# Patient Record
Sex: FEMALE | Race: WHITE
[De-identification: names, ages, dates, MRNs, and addresses within clinical notes are randomized per-mention and may not be internally consistent; named-entity substitution may affect disease eponyms.]

---

## 2017-04-21 ENCOUNTER — HOSPITAL ENCOUNTER (EMERGENCY)
Dept: HOSPITAL 17 - PHED | Age: 27
Discharge: HOME | End: 2017-04-21
Payer: SELF-PAY

## 2017-04-21 VITALS
SYSTOLIC BLOOD PRESSURE: 151 MMHG | OXYGEN SATURATION: 98 % | DIASTOLIC BLOOD PRESSURE: 79 MMHG | HEART RATE: 93 BPM | TEMPERATURE: 98.1 F | RESPIRATION RATE: 16 BRPM

## 2017-04-21 VITALS
OXYGEN SATURATION: 98 % | RESPIRATION RATE: 18 BRPM | SYSTOLIC BLOOD PRESSURE: 110 MMHG | DIASTOLIC BLOOD PRESSURE: 74 MMHG | HEART RATE: 68 BPM

## 2017-04-21 VITALS — HEIGHT: 60 IN | WEIGHT: 132.28 LBS | BODY MASS INDEX: 25.97 KG/M2

## 2017-04-21 VITALS
SYSTOLIC BLOOD PRESSURE: 106 MMHG | HEART RATE: 62 BPM | RESPIRATION RATE: 18 BRPM | OXYGEN SATURATION: 98 % | DIASTOLIC BLOOD PRESSURE: 73 MMHG

## 2017-04-21 DIAGNOSIS — F17.200: ICD-10-CM

## 2017-04-21 DIAGNOSIS — R00.2: Primary | ICD-10-CM

## 2017-04-21 DIAGNOSIS — T50.905A: ICD-10-CM

## 2017-04-21 DIAGNOSIS — Z86.59: ICD-10-CM

## 2017-04-21 LAB
ANION GAP SERPL CALC-SCNC: 7 MEQ/L (ref 5–15)
APAP SERPL-MCNC: 4.6 MCG/ML (ref 10–30)
BASOPHILS # BLD AUTO: 0 TH/MM3 (ref 0–0.2)
BASOPHILS NFR BLD: 0.7 % (ref 0–2)
BUN SERPL-MCNC: 7 MG/DL (ref 7–18)
CHLORIDE SERPL-SCNC: 105 MEQ/L (ref 98–107)
EOSINOPHIL # BLD: 0.1 TH/MM3 (ref 0–0.4)
EOSINOPHIL NFR BLD: 2.4 % (ref 0–4)
ERYTHROCYTE [DISTWIDTH] IN BLOOD BY AUTOMATED COUNT: 12.2 % (ref 11.6–17.2)
GFR SERPLBLD BASED ON 1.73 SQ M-ARVRAT: 120 ML/MIN (ref 89–?)
HCO3 BLD-SCNC: 26.8 MEQ/L (ref 21–32)
HCT VFR BLD CALC: 36.9 % (ref 35–46)
HEMO FLAGS: (no result)
LYMPHOCYTES # BLD AUTO: 1.1 TH/MM3 (ref 1–4.8)
LYMPHOCYTES NFR BLD AUTO: 27.3 % (ref 9–44)
MCH RBC QN AUTO: 30.5 PG (ref 27–34)
MCHC RBC AUTO-ENTMCNC: 35.4 % (ref 32–36)
MCV RBC AUTO: 86.3 FL (ref 80–100)
MONOCYTES NFR BLD: 13.1 % (ref 0–8)
NEUTROPHILS # BLD AUTO: 2.4 TH/MM3 (ref 1.8–7.7)
NEUTROPHILS NFR BLD AUTO: 56.5 % (ref 16–70)
PLATELET # BLD: 199 TH/MM3 (ref 150–450)
POTASSIUM SERPL-SCNC: 3.8 MEQ/L (ref 3.5–5.1)
RBC # BLD AUTO: 4.27 MIL/MM3 (ref 4–5.3)
SODIUM SERPL-SCNC: 139 MEQ/L (ref 136–145)
WBC # BLD AUTO: 4.1 TH/MM3 (ref 4–11)

## 2017-04-21 PROCEDURE — 80307 DRUG TEST PRSMV CHEM ANLYZR: CPT

## 2017-04-21 PROCEDURE — 85025 COMPLETE CBC W/AUTO DIFF WBC: CPT

## 2017-04-21 PROCEDURE — 99283 EMERGENCY DEPT VISIT LOW MDM: CPT

## 2017-04-21 PROCEDURE — 96360 HYDRATION IV INFUSION INIT: CPT

## 2017-04-21 PROCEDURE — 80048 BASIC METABOLIC PNL TOTAL CA: CPT

## 2017-04-21 NOTE — PD
HPI


Chief Complaint:  Allergic/Adverse Reaction


Time Seen by Provider:  18:48


Travel History


International Travel<30 days:  No


Contact w/Intl Traveler<30days:  No


Traveled to known affect area:  No





History of Present Illness


HPI


This 26-year-old female says she doesn't feel well.  She been taking some over-

the-counter cough and cold medicine when she went to see her doctor today who 

gave her additional cough and cold medicine.  He has taken Delsym, Sudafed, 

cetirizine and other medication.  She thought she was given a pass out earlier.

  She had some palpitations.





PFSH


Past Medical History


Anxiety:  Yes





Social History


Alcohol Use:  No


Tobacco Use:  Yes (1/2PPD)


Substance Use:  No





Allergies-Medications


(Allergen,Severity, Reaction):  


Coded Allergies:  


     No Known Allergies (Verified , 4/21/17)


Reported Meds & Prescriptions





Reported Meds & Active Scripts


Active


Reported


Proventil Hfa 6.7 GM Inh (Albuterol Sulfate) 90 Mcg/Act Aer 2 Puff INH Q4-6H PRN








Review of Systems


General / Constitutional:  No: Fever, Chills


Eyes:  No: Diploplia


HENT:  No: Headaches


Cardiovascular:  Positive: Palpitations,  No: Chest Pain or Discomfort


Respiratory:  No: Cough, Shortness of Breath


Gastrointestinal:  Positive: Nausea


Genitourinary:  No: Urgency, Frequency


Musculoskeletal:  No: Myalgias


Skin:  No Rash


Neurologic:  Positive: Weakness,  No: Syncope, Focal Abnormalities


Endocrine:  No: Heat Intolerance


Hematologic/Lymphatic:  No: Easy Bruising





Physical Exam


Narrative


GENERAL: Well-developed female


SKIN: Focused skin assessment warm/dry.


HEAD: Atraumatic. Normocephalic. 


EYES: Pupils equal and round. No scleral icterus. No injection or drainage. 


ENT: No nasal bleeding or discharge.  Mucous membranes pink and moist.


NECK: Trachea midline. No JVD. 


CARDIOVASCULAR: Regular rate and rhythm.  No murmur appreciated.


RESPIRATORY: No accessory muscle use. Clear to auscultation. Breath sounds 

equal bilaterally. 


GASTROINTESTINAL: Abdomen soft, non-tender, nondistended. Hepatic and splenic 

margins not palpable. 


MUSCULOSKELETAL: No obvious deformities. No clubbing.  No cyanosis.  No edema. 


NEUROLOGICAL: Awake and alert. No obvious cranial nerve deficits.  Motor 

grossly within normal limits. Normal speech.


PSYCHIATRIC: Appropriate mood and affect; insight and judgment normal.





Data


Data


Last Documented VS








Vital Signs








  Date Time  Temp Pulse Resp B/P Pulse Ox O2 Delivery O2 Flow Rate FiO2


 


4/21/17 20:46  62 18 106/73 98 Room Air  


 


4/21/17 18:47 98.1       











Orders








 Complete Blood Count With Diff (4/21/17 18:54)


Basic Metabolic Panel (Bmp) (4/21/17 18:54)


Tylenol (Acetaminophen) (4/21/17 18:54)


Sodium Chlor 0.9% 1000 Ml Inj (Ns 1000 M (4/21/17 19:00)








Labs








 Laboratory Tests








Test 4/21/17





 19:05


 


White Blood Count 4.1 TH/MM3


 


Red Blood Count 4.27 MIL/MM3


 


Hemoglobin 13.0 GM/DL


 


Hematocrit 36.9 %


 


Mean Corpuscular Volume 86.3 FL


 


Mean Corpuscular Hemoglobin 30.5 PG


 


Mean Corpuscular Hemoglobin 35.4 %





Concent 


 


Red Cell Distribution Width 12.2 %


 


Platelet Count 199 TH/MM3


 


Mean Platelet Volume 7.8 FL


 


Neutrophils (%) (Auto) 56.5 %


 


Lymphocytes (%) (Auto) 27.3 %


 


Monocytes (%) (Auto) 13.1 %


 


Eosinophils (%) (Auto) 2.4 %


 


Basophils (%) (Auto) 0.7 %


 


Neutrophils # (Auto) 2.4 TH/MM3


 


Lymphocytes # (Auto) 1.1 TH/MM3


 


Monocytes # (Auto) 0.5 TH/MM3


 


Eosinophils # (Auto) 0.1 TH/MM3


 


Basophils # (Auto) 0.0 TH/MM3


 


CBC Comment DIFF FINAL 


 


Differential Comment  


 


Sodium Level 139 MEQ/L


 


Potassium Level 3.8 MEQ/L


 


Chloride Level 105 MEQ/L


 


Carbon Dioxide Level 26.8 MEQ/L


 


Anion Gap 7 MEQ/L


 


Blood Urea Nitrogen 7 MG/DL


 


Creatinine 0.61 MG/DL


 


Estimat Glomerular Filtration 120 ML/MIN





Rate 


 


Random Glucose 93 MG/DL


 


Calcium Level 8.5 MG/DL


 


Acetaminophen Level 4.6 MCG/ML














MDM


Medical Decision Making


Medical Screen Exam Complete:  Yes


Emergency Medical Condition:  Yes


Medical Record Reviewed:  Yes


Differential Diagnosis


Differential includes adverse medication reaction,


Narrative Course


Patient has been stable in the ER.  Her pulse rate is normal.  She has been 

observed and is feeling better.  She is stable for discharge.  I do believe her 

symptoms are secondary to a combination of medications ibuprofen recommended 

that she not take anything except Tylenol or Motrin for respiratory infection





Diagnosis





 Primary Impression:  


 Adverse reaction to over-the-counter medication


 Qualified Code:  T50.905A - Adverse reaction to over-the-counter medication, 

initial encounter


Disposition:  01 DISCHARGE HOME


Condition:  Stable








Theron Neely MD Apr 21, 2017 18:58

## 2017-07-31 ENCOUNTER — HOSPITAL ENCOUNTER (EMERGENCY)
Dept: HOSPITAL 17 - PHED | Age: 27
Discharge: HOME | End: 2017-07-31
Payer: SELF-PAY

## 2017-07-31 VITALS
RESPIRATION RATE: 17 BRPM | SYSTOLIC BLOOD PRESSURE: 116 MMHG | DIASTOLIC BLOOD PRESSURE: 64 MMHG | HEART RATE: 66 BPM | OXYGEN SATURATION: 99 %

## 2017-07-31 VITALS
RESPIRATION RATE: 16 BRPM | SYSTOLIC BLOOD PRESSURE: 124 MMHG | HEART RATE: 102 BPM | OXYGEN SATURATION: 99 % | TEMPERATURE: 98.4 F | DIASTOLIC BLOOD PRESSURE: 89 MMHG

## 2017-07-31 VITALS
OXYGEN SATURATION: 100 % | RESPIRATION RATE: 16 BRPM | SYSTOLIC BLOOD PRESSURE: 124 MMHG | DIASTOLIC BLOOD PRESSURE: 64 MMHG | HEART RATE: 72 BPM

## 2017-07-31 VITALS
SYSTOLIC BLOOD PRESSURE: 109 MMHG | DIASTOLIC BLOOD PRESSURE: 61 MMHG | RESPIRATION RATE: 18 BRPM | HEART RATE: 71 BPM | OXYGEN SATURATION: 98 %

## 2017-07-31 VITALS
HEART RATE: 63 BPM | OXYGEN SATURATION: 100 % | RESPIRATION RATE: 18 BRPM | TEMPERATURE: 98 F | DIASTOLIC BLOOD PRESSURE: 62 MMHG | SYSTOLIC BLOOD PRESSURE: 111 MMHG

## 2017-07-31 VITALS — WEIGHT: 124.56 LBS | BODY MASS INDEX: 22.07 KG/M2 | HEIGHT: 63 IN

## 2017-07-31 DIAGNOSIS — F17.200: ICD-10-CM

## 2017-07-31 DIAGNOSIS — G44.209: Primary | ICD-10-CM

## 2017-07-31 DIAGNOSIS — Z87.09: ICD-10-CM

## 2017-07-31 DIAGNOSIS — R94.31: ICD-10-CM

## 2017-07-31 DIAGNOSIS — R06.02: ICD-10-CM

## 2017-07-31 DIAGNOSIS — Z86.59: ICD-10-CM

## 2017-07-31 DIAGNOSIS — Z79.899: ICD-10-CM

## 2017-07-31 LAB
ALP SERPL-CCNC: 47 U/L (ref 45–117)
ALT SERPL-CCNC: 21 U/L (ref 10–53)
AMPHETAMINE, URINE: (no result)
ANION GAP SERPL CALC-SCNC: 9 MEQ/L (ref 5–15)
APAP SERPL-MCNC: (no result) MCG/ML (ref 10–30)
AST SERPL-CCNC: 17 U/L (ref 15–37)
BARBITURATES, URINE: (no result)
BASOPHILS # BLD AUTO: 0 TH/MM3 (ref 0–0.2)
BASOPHILS NFR BLD: 0.6 % (ref 0–2)
BILIRUB SERPL-MCNC: 0.3 MG/DL (ref 0.2–1)
BUN SERPL-MCNC: 9 MG/DL (ref 7–18)
CHLORIDE SERPL-SCNC: 107 MEQ/L (ref 98–107)
COCAINE UR-MCNC: (no result) NG/ML
COLOR UR: YELLOW
COMMENT (UR): (no result)
CULTURE IF INDICATED: (no result)
EOSINOPHIL # BLD: 0.1 TH/MM3 (ref 0–0.4)
EOSINOPHIL NFR BLD: 1.7 % (ref 0–4)
ERYTHROCYTE [DISTWIDTH] IN BLOOD BY AUTOMATED COUNT: 12.8 % (ref 11.6–17.2)
GFR SERPLBLD BASED ON 1.73 SQ M-ARVRAT: 120 ML/MIN (ref 89–?)
GLUCOSE UR STRIP-MCNC: (no result) MG/DL
HCO3 BLD-SCNC: 23.7 MEQ/L (ref 21–32)
HCT VFR BLD CALC: 38.8 % (ref 35–46)
HEMO FLAGS: (no result)
HGB UR QL STRIP: (no result)
KETONES UR STRIP-MCNC: (no result) MG/DL
LYMPHOCYTES # BLD AUTO: 1.7 TH/MM3 (ref 1–4.8)
LYMPHOCYTES NFR BLD AUTO: 29.2 % (ref 9–44)
MCH RBC QN AUTO: 29 PG (ref 27–34)
MCHC RBC AUTO-ENTMCNC: 33.3 % (ref 32–36)
MCV RBC AUTO: 87.2 FL (ref 80–100)
MONOCYTES NFR BLD: 9 % (ref 0–8)
NEUTROPHILS # BLD AUTO: 3.5 TH/MM3 (ref 1.8–7.7)
NEUTROPHILS NFR BLD AUTO: 59.5 % (ref 16–70)
NITRITE UR QL STRIP: (no result)
PLATELET # BLD: 226 TH/MM3 (ref 150–450)
POTASSIUM SERPL-SCNC: 3.6 MEQ/L (ref 3.5–5.1)
RBC # BLD AUTO: 4.44 MIL/MM3 (ref 4–5.3)
SODIUM SERPL-SCNC: 140 MEQ/L (ref 136–145)
SP GR UR STRIP: 1.01 (ref 1–1.03)
SQUAMOUS #/AREA URNS HPF: (no result) /HPF (ref 0–5)
WBC # BLD AUTO: 5.8 TH/MM3 (ref 4–11)

## 2017-07-31 PROCEDURE — 83880 ASSAY OF NATRIURETIC PEPTIDE: CPT

## 2017-07-31 PROCEDURE — 83690 ASSAY OF LIPASE: CPT

## 2017-07-31 PROCEDURE — 84443 ASSAY THYROID STIM HORMONE: CPT

## 2017-07-31 PROCEDURE — 85025 COMPLETE CBC W/AUTO DIFF WBC: CPT

## 2017-07-31 PROCEDURE — 80307 DRUG TEST PRSMV CHEM ANLYZR: CPT

## 2017-07-31 PROCEDURE — 99285 EMERGENCY DEPT VISIT HI MDM: CPT

## 2017-07-31 PROCEDURE — 84703 CHORIONIC GONADOTROPIN ASSAY: CPT

## 2017-07-31 PROCEDURE — 96375 TX/PRO/DX INJ NEW DRUG ADDON: CPT

## 2017-07-31 PROCEDURE — 96374 THER/PROPH/DIAG INJ IV PUSH: CPT

## 2017-07-31 PROCEDURE — 80053 COMPREHEN METABOLIC PANEL: CPT

## 2017-07-31 PROCEDURE — 84484 ASSAY OF TROPONIN QUANT: CPT

## 2017-07-31 PROCEDURE — 93005 ELECTROCARDIOGRAM TRACING: CPT

## 2017-07-31 PROCEDURE — 71275 CT ANGIOGRAPHY CHEST: CPT

## 2017-07-31 PROCEDURE — 70450 CT HEAD/BRAIN W/O DYE: CPT

## 2017-07-31 PROCEDURE — 81001 URINALYSIS AUTO W/SCOPE: CPT

## 2017-07-31 NOTE — RADRPT
EXAM DATE/TIME:  07/31/2017 21:02 

 

[facility] COMPARISON:     

No previous studies available for comparison.

 

 

INDICATIONS :     

Chronic shortness of breath.

                      

 

IV CONTRAST:     

60 cc Omnipaque 350 (iohexol) IV 

 

 

RADIATION DOSE:     

5.83 CTDIvol (mGy) 

 

 

MEDICAL HISTORY :     

None  Asthma

 

SURGICAL HISTORY :      

None. 

 

ENCOUNTER:      

Initial

 

ACUITY:      

1 yr

 

PAIN SCALE:      

0/10

 

LOCATION:        

chest 

 

TECHNIQUE:     

Volumetric scanning of the chest was performed using a pulmonary embolism protocol MIP images were re
constructed.  Using automated exposure control and adjustment of the mA and/or kV according to patien
t size, radiation dose was kept as low as reasonably achievable to obtain optimal diagnostic quality 
images.   DICOM format image data is available electronically for review and comparison.  

 

Follow-up recommendations for incidentally detected pulmonary nodules are based at a minimum on nodul
e size and patient risk factors according to Fleischner Society Guidelines.

 

FINDINGS:     

 

PULMONARY ARTERIES:

No filling defects are seen in the pulmonary arteries through the segmental level.

 

LUNGS:     

There is no consolidation or pneumothorax .  No concerning pulmonary nodule is visualized.

 

PLEURAE:     

There is no pleural thickening or pleural effusion.

 

MEDIASTINUM:     

There is good visualization of the great vessels of the middle mediastinum.  No evidence of mediastin
al or hilar adenopathy/mass.

 

CONCLUSION:     

The study is negative for pulmonary embolism.

 

 

 

 

 Giorgi Mcduffie MD on July 31, 2017 at 22:00           

Board Certified Radiologist.

 This report was verified electronically.

## 2017-07-31 NOTE — PD
HPI


Chief Complaint:  Headache


Time Seen by Provider:  19:42


Travel History


International Travel<30 days:  No


Contact w/Intl Traveler<30days:  No


Traveled to known affect area:  No





History of Present Illness


HPI


PATIENT PRESENT C/O CHRONIC SOB, THAT HER PCP HAS NOT ADDRESSED, SHE FEELS THE 

NEED TO TAKE DEEP BREATHS ALL THE TIME, AND TODAY IN ADDITION TO THAT ALSO HAS 

HA, FRONTAL/POSTERIOR SKULL AREA IN A HEAD BAND TYPE OF SENSATION, 7/10, NO 

PHOTOPHOBIA/N/V/CP/ABD PAIN AT THIS POINT








PATIENT HAS PCP DR BEAUCHAMP?SP





PFSH


Past Medical History


Asthma:  Yes


Anxiety:  Yes


Respiratory:  Yes (asthma)


Pregnant?:  Not Pregnant


LMP:  3 WEEKS AGO





Social History


Alcohol Use:  No


Tobacco Use:  Yes (1/2PPD)


Substance Use:  No





Allergies-Medications


(Allergen,Severity, Reaction):  


Uncoded Allergies:  


     pollen (Allergy, Unknown, Sneezing, 7/31/17)


Reported Meds & Prescriptions





Reported Meds & Active Scripts


Active


Reported


Advil Allergy Sinus (Chlorpheniramine-Pseudoephedrine-Ibuprofen)  2- Mg 

Tab 1 Tab PO Q4H PRN


Cetirizine (Cetirizine HCl) 10 Mg Tab 10 Mg PO DAILY


Proventil Hfa 6.7 GM Inh (Albuterol Sulfate) 90 Mcg/Act Aer 2 Puff INH Q4-6H PRN








Review of Systems


Except as stated in HPI:  all other systems reviewed are Neg


HENT:  Positive: Headaches





Physical Exam


Narrative


GENERAL: 


SKIN: Warm and dry.


HEAD: Atraumatic. Normocephalic. 


EYES: Pupils equal and round. No scleral icterus. No injection or drainage. 


ENT: No nasal bleeding or discharge.  Mucous membranes pink and moist.


NECK: Trachea midline. No JVD. 


CARDIOVASCULAR: Regular rate and rhythm.  


RESPIRATORY: No accessory muscle use. Clear to auscultation. Breath sounds 

equal bilaterally. 


GASTROINTESTINAL: Abdomen soft, non-tender, nondistended. Hepatic and splenic 

margins not palpable. 


MUSCULOSKELETAL: Extremities without clubbing, cyanosis, or edema. No obvious 

deformities. 


NEUROLOGICAL: Awake and alert. No obvious cranial nerve deficits.  Motor 

grossly within normal limits. Five out of 5 muscle strength in the arms and 

legs.  Normal speech.


PSYCHIATRIC: Appropriate mood and affect; insight and judgment normal.





Data


Data


Last Documented VS





Vital Signs








  Date Time  Temp Pulse Resp B/P Pulse Ox O2 Delivery O2 Flow Rate FiO2


 


7/31/17 21:05   17     


 


7/31/17 21:01  71  109/61 98 Room Air  


 


7/31/17 18:43 98.4       








Orders





 Electrocardiogram (7/31/17 19:52)


Complete Blood Count With Diff (7/31/17 19:52)


Comprehensive Metabolic Panel (7/31/17 19:52)


Troponin I (7/31/17 19:52)


B-Type Natriuretic Peptide (7/31/17 19:52)


Lipase (7/31/17 19:52)


Urinalysis - C+S If Indicated (7/31/17 19:52)


Thyroid Stimulating Hormone (7/31/17 19:52)


Ct Brain W/O Iv Contrast(Rout) (7/31/17 19:52)


Iv Access Insert/Monitor (7/31/17 19:52)


Ed Urine Pregnancytest Poc (7/31/17 19:52)


Drug Screen, Random Urine (7/31/17 19:52)


Alcohol (Ethanol) (7/31/17 19:52)


Salicylates (Aspirin) (7/31/17 19:52)


Tylenol (Acetaminophen) (7/31/17 19:52)


Ct Pulmonary Angiogram (7/31/17 19:52)


Morphine Inj (Morphine Inj) (7/31/17 21:00)


Ondansetron Inj (Zofran Inj) (7/31/17 21:00)





Labs





 Laboratory Tests








Test 7/31/17





 19:59


 


White Blood Count 5.8 TH/MM3


 


Red Blood Count 4.44 MIL/MM3


 


Hemoglobin 12.9 GM/DL


 


Hematocrit 38.8 %


 


Mean Corpuscular Volume 87.2 FL


 


Mean Corpuscular Hemoglobin 29.0 PG


 


Mean Corpuscular Hemoglobin 33.3 %





Concent 


 


Red Cell Distribution Width 12.8 %


 


Platelet Count 226 TH/MM3


 


Mean Platelet Volume 8.5 FL


 


Neutrophils (%) (Auto) 59.5 %


 


Lymphocytes (%) (Auto) 29.2 %


 


Monocytes (%) (Auto) 9.0 %


 


Eosinophils (%) (Auto) 1.7 %


 


Basophils (%) (Auto) 0.6 %


 


Neutrophils # (Auto) 3.5 TH/MM3


 


Lymphocytes # (Auto) 1.7 TH/MM3


 


Monocytes # (Auto) 0.5 TH/MM3


 


Eosinophils # (Auto) 0.1 TH/MM3


 


Basophils # (Auto) 0.0 TH/MM3


 


CBC Comment DIFF FINAL 


 


Differential Comment  


 


Urine Color YELLOW 


 


Urine Turbidity CLEAR 


 


Urine pH 6.0 


 


Urine Specific Gravity 1.006 


 


Urine Protein NEG mg/dL


 


Urine Glucose (UA) NEG mg/dL


 


Urine Ketones NEG mg/dL


 


Urine Occult Blood NEG 


 


Urine Nitrite NEG 


 


Urine Bilirubin NEG 


 


Urine Leukocyte Esterase NEG 


 


Urine Squamous Epithelial 0-5 /hpf





Cells 


 


Microscopic Urinalysis Comment CULT NOT





 INDICATED


 


Sodium Level 140 MEQ/L


 


Potassium Level 3.6 MEQ/L


 


Chloride Level 107 MEQ/L


 


Carbon Dioxide Level 23.7 MEQ/L


 


Anion Gap 9 MEQ/L


 


Blood Urea Nitrogen 9 MG/DL


 


Creatinine 0.61 MG/DL


 


Estimat Glomerular Filtration 120 ML/MIN





Rate 


 


Random Glucose 85 MG/DL


 


Calcium Level 8.6 MG/DL


 


Total Bilirubin 0.3 MG/DL


 


Aspartate Amino Transf 17 U/L





(AST/SGOT) 


 


Alanine Aminotransferase 21 U/L





(ALT/SGPT) 


 


Alkaline Phosphatase 47 U/L


 


Troponin I LESS THAN 0.02





 NG/ML


 


B-Type Natriuretic Peptide 8 PG/ML


 


Total Protein 7.3 GM/DL


 


Albumin 4.0 GM/DL


 


Lipase 151 U/L


 


Thyroid Stimulating Hormone 3.410 uIU/ML





3rd Gen 


 


Salicylates Level LESS THAN 1.7





 MG/DL


 


Urine Opiates Screen NEG 


 


Acetaminophen Level LESS THAN 2.0





 MCG/ML


 


Urine Barbiturates Screen NEG 


 


Urine Amphetamines Screen NEG 


 


Urine Benzodiazepines Screen NEG 


 


Urine Cocaine Screen NEG 


 


Urine Cannabinoids Screen NEG 


 


Ethyl Alcohol Level LESS THAN 3





 MG/DL











[facility]


Medical Decision Making


Medical Screen Exam Complete:  Yes


Emergency Medical Condition:  Yes


Medical Record Reviewed:  Yes


Differential Diagnosis


ICH V SINUS V TENSION HA V PREGNANCY RELATED V PE V PNA V PERICARD EFFUSION


Narrative Course


CT HEAD NEG FOR ICH, OR SINUSITIS...NONPREGNANT....CT CHEST NO PE, PERICARDIAL 

EFFUSION, PNA AT THIS POINT.





Diagnosis





 Primary Impression:  


 TENSION HEADACHE


 Additional Impression:  


 PERCEIVED SHORTNESS OF BREATH


Referrals:  


Franklin Sabillon MD





***Additional Instructions:


YOU ARE ADVISED TO FOLLOW UP WITH YOUR DOCTOR FOR REFERRAL TO PULMONOLOGIST FOR 

FURTHER EVALUATION AND CARE


Disposition:  01 DISCHARGE HOME


Condition:  Stable








David Birmingham MD Jul 31, 2017 19:43





David Birmingham MD Jul 31, 2017 19:43

## 2017-07-31 NOTE — RADRPT
EXAM DATE/TIME:  07/31/2017 20:55 

 

[facility] COMPARISON:     

No previous studies available for comparison.

 

 

INDICATIONS :     

Headache and chronic shortness of breath.

                      

 

RADIATION DOSE:     

62.82 CTDIvol (mGy) 

 

 

 

MEDICAL HISTORY :       

asthma

 

SURGICAL HISTORY :      

None. 

 

ENCOUNTER:      

Initial

 

ACUITY:      

2 days

 

PAIN SCALE:      

6/10

 

LOCATION:        

cranial 

 

TECHNIQUE:     

Multiple contiguous axial images were obtained of the head.  Using automated exposure control and adj
ustment of the mA and/or kV according to patient size, radiation dose was kept as low as reasonably a
chievable to obtain optimal diagnostic quality images.   DICOM format image data is available electro
nically for review and comparison.  

 

FINDINGS:     

 

CEREBRUM:     

The ventricles are normal for age.  No evidence of midline shift, mass lesion, hemorrhage or acute in
farction.  No extra-axial fluid collections are seen.

 

POSTERIOR FOSSA:     

The cerebellum and brainstem are intact.  The 4th ventricle is midline.  The cerebellopontine angle i
s unremarkable.

 

EXTRACRANIAL:     

The visualized portion of the orbits is intact.

 

SKULL:     

The calvaria is intact.  No evidence of skull fracture.

 

CONCLUSION:     

Negative noncontrast CT brain.

 

 

 

 Giorgi Mcduffie MD on July 31, 2017 at 22:02           

Board Certified Radiologist.

 This report was verified electronically.

## 2017-08-01 NOTE — EKG
Date Performed: 07/31/2017       Time Performed: 20:00:31

 

PTAGE:      26 years

 

EKG:      Sinus rhythm 

 

 WITH MARKED SINUS ARRHYTHMIA BORDERLINE ECG 

 

NO PREVIOUS TRACING            

 

DOCTOR:   Darron Gannon  Interpretating Date/Time  08/01/2017 12:11:37

## 2017-11-29 ENCOUNTER — HOSPITAL ENCOUNTER (EMERGENCY)
Dept: HOSPITAL 17 - NEPD | Age: 27
LOS: 1 days | Discharge: HOME | End: 2017-11-30
Payer: SELF-PAY

## 2017-11-29 VITALS
SYSTOLIC BLOOD PRESSURE: 141 MMHG | TEMPERATURE: 98.7 F | DIASTOLIC BLOOD PRESSURE: 82 MMHG | HEART RATE: 118 BPM | OXYGEN SATURATION: 100 % | RESPIRATION RATE: 20 BRPM

## 2017-11-29 VITALS — WEIGHT: 120.26 LBS | BODY MASS INDEX: 22.13 KG/M2 | HEIGHT: 62 IN

## 2017-11-29 DIAGNOSIS — J45.909: ICD-10-CM

## 2017-11-29 DIAGNOSIS — K21.9: ICD-10-CM

## 2017-11-29 DIAGNOSIS — F41.9: ICD-10-CM

## 2017-11-29 DIAGNOSIS — Z79.899: ICD-10-CM

## 2017-11-29 DIAGNOSIS — I49.8: ICD-10-CM

## 2017-11-29 DIAGNOSIS — R10.13: Primary | ICD-10-CM

## 2017-11-29 LAB
ALP SERPL-CCNC: 50 U/L (ref 45–117)
ALT SERPL-CCNC: 18 U/L (ref 10–53)
ANION GAP SERPL CALC-SCNC: 7 MEQ/L (ref 5–15)
AST SERPL-CCNC: 11 U/L (ref 15–37)
BASOPHILS # BLD AUTO: 0 TH/MM3 (ref 0–0.2)
BASOPHILS NFR BLD: 0.3 % (ref 0–2)
BILIRUB SERPL-MCNC: 0.2 MG/DL (ref 0.2–1)
BUN SERPL-MCNC: 7 MG/DL (ref 7–18)
CHLORIDE SERPL-SCNC: 103 MEQ/L (ref 98–107)
COLOR UR: COLORLESS
COMMENT (UR): (no result)
CULTURE IF INDICATED: (no result)
EOSINOPHIL # BLD: 0 TH/MM3 (ref 0–0.4)
EOSINOPHIL NFR BLD: 0.6 % (ref 0–4)
ERYTHROCYTE [DISTWIDTH] IN BLOOD BY AUTOMATED COUNT: 13.3 % (ref 11.6–17.2)
GFR SERPLBLD BASED ON 1.73 SQ M-ARVRAT: 112 ML/MIN (ref 89–?)
GLUCOSE UR STRIP-MCNC: (no result) MG/DL
HCO3 BLD-SCNC: 26.9 MEQ/L (ref 21–32)
HCT VFR BLD CALC: 39.9 % (ref 35–46)
HEMO FLAGS: (no result)
HGB UR QL STRIP: (no result)
KETONES UR STRIP-MCNC: (no result) MG/DL
LYMPHOCYTES # BLD AUTO: 1.3 TH/MM3 (ref 1–4.8)
LYMPHOCYTES NFR BLD AUTO: 19.1 % (ref 9–44)
MCH RBC QN AUTO: 29.6 PG (ref 27–34)
MCHC RBC AUTO-ENTMCNC: 33.7 % (ref 32–36)
MCV RBC AUTO: 87.8 FL (ref 80–100)
MONOCYTES NFR BLD: 6.1 % (ref 0–8)
NEUTROPHILS # BLD AUTO: 5.2 TH/MM3 (ref 1.8–7.7)
NEUTROPHILS NFR BLD AUTO: 73.9 % (ref 16–70)
NITRITE UR QL STRIP: (no result)
PLATELET # BLD: 233 TH/MM3 (ref 150–450)
POTASSIUM SERPL-SCNC: 3.6 MEQ/L (ref 3.5–5.1)
RBC # BLD AUTO: 4.54 MIL/MM3 (ref 4–5.3)
SODIUM SERPL-SCNC: 137 MEQ/L (ref 136–145)
SP GR UR STRIP: 1 (ref 1–1.03)
WBC # BLD AUTO: 7 TH/MM3 (ref 4–11)

## 2017-11-29 PROCEDURE — 71010: CPT

## 2017-11-29 PROCEDURE — 99285 EMERGENCY DEPT VISIT HI MDM: CPT

## 2017-11-29 PROCEDURE — 93005 ELECTROCARDIOGRAM TRACING: CPT

## 2017-11-29 PROCEDURE — 85025 COMPLETE CBC W/AUTO DIFF WBC: CPT

## 2017-11-29 PROCEDURE — 84703 CHORIONIC GONADOTROPIN ASSAY: CPT

## 2017-11-29 PROCEDURE — 80053 COMPREHEN METABOLIC PANEL: CPT

## 2017-11-29 PROCEDURE — 81001 URINALYSIS AUTO W/SCOPE: CPT

## 2017-11-29 PROCEDURE — 83690 ASSAY OF LIPASE: CPT

## 2017-11-29 NOTE — RADRPT
EXAM DATE/TIME:  11/29/2017 22:19 

 

[facility] COMPARISON:     

No previous studies available for comparison.

 

                     

INDICATIONS :     

Short of breath. 

                     

 

MEDICAL HISTORY :     

None.          

 

SURGICAL HISTORY :     

None.   

 

ENCOUNTER:     

Initial                                        

 

ACUITY:     

2 days      

 

PAIN SCORE:     

0/10

 

LOCATION:     

Bilateral chest 

 

FINDINGS:     

A single view of the chest demonstrates the lungs to be symmetrically aerated without evidence of mas
s, infiltrate or effusion.  The cardiomediastinal contours are unremarkable.  Osseous structures are 
intact.

 

CONCLUSION:     

No acute cardiopulmonary process.

 

 

 

 Wilfrid Brown MD on November 29, 2017 at 22:41           

Board Certified Radiologist.

 This report was verified electronically.

## 2017-11-29 NOTE — PD
HPI


Chief Complaint:  Chest Pain


Time Seen by Provider:  21:33


Travel History


International Travel<30 days:  No


Contact w/Intl Traveler<30days:  No


Traveled to known affect area:  No





History of Present Illness


HPI


28yo F presents to the ED with multiple complaints.  She said she has been 

having burning epigastric pain for a year and has seen multiple providers and 

told it could be GERD or could be anxiety.  Said pain is burning, worst after 

eating and also radiates to her chest.  She feels like she "cant catch her 

breath".  Also with occasional tingling in tips of fingers in bilateral hands. 

Denies any fever, n/v, abdominal pain, focal weakness. Has an appointment next 

week with GI.





[facility]


Past Medical History


Asthma:  Yes


Anxiety:  Yes


Diminished Hearing:  No


GERD:  Yes


Respiratory:  Yes (asthma)


Immunizations Current:  Yes


Pregnant?:  Not Pregnant


LMP:  11/15/2017





Past Surgical History


Surgical History:  No Previous Surgery





Social History


Alcohol Use:  Yes (a couple days a week)


Tobacco Use:  No


Substance Use:  No





Allergies-Medications


(Allergen,Severity, Reaction):  


Uncoded Allergies:  


     pollen (Allergy, Unknown, Sneezing, 7/31/17)


Reported Meds & Prescriptions





Reported Meds & Active Scripts


Active


Protonix (Pantoprazole Sodium) 40 Mg Tab 40 Mg PO DAILY


Reported


Proventil Hfa 6.7 GM Inh (Albuterol Sulfate) 90 Mcg/Act Aer 2 Puff INH Q4-6H PRN








Review of Systems


Except as stated in HPI:  all other systems reviewed are Neg





Physical Exam


Narrative


GENERAL: 28yo F in mild distress.


SKIN: Focused skin assessment warm/dry.


HEAD: Atraumatic. Normocephalic. 


EYES: Pupils equal and round. No scleral icterus. No injection or drainage. 


ENT: No nasal bleeding or discharge.  Mucous membranes pink and moist.  TM wnl 

bilaterally.


NECK: Trachea midline. No JVD. 


CARDIOVASCULAR: Regular rate and rhythm.  No murmur appreciated.


RESPIRATORY: No accessory muscle use. Clear to auscultation. Breath sounds 

equal bilaterally. 


GASTROINTESTINAL: Abdomen soft, non-tender, nondistended. 


MUSCULOSKELETAL: No obvious deformities. No clubbing.  No cyanosis.  No edema. 


NEUROLOGICAL: Awake and alert. No obvious cranial nerve deficits.  Motor 

grossly within normal limits. Normal speech.


PSYCHIATRIC: Anxious appearing.





Data


Data


Last Documented VS





Vital Signs








  Date Time  Temp Pulse Resp B/P (MAP) Pulse Ox O2 Delivery O2 Flow Rate FiO2


 


11/29/17 18:37 98.7 118 20 141/82 (101) 100 Room Air  








Orders





 Orders


Complete Blood Count With Diff (11/29/17 18:50)


Urinalysis - C+S If Indicated (11/29/17 18:50)


Ed Urine Pregnancytest Poc (11/29/17 18:50)


Comprehensive Metabolic Panel (11/29/17 18:50)


Lipase (11/29/17 18:50)


Electrocardiogram (11/29/17 )


Chest, Single Ap (11/29/17 )


Pantoprazole (Protonix) (11/29/17 21:45)


Lorazepam (Ativan) (11/29/17 21:45)





Labs





Laboratory Tests








Test


  11/29/17


19:46


 


White Blood Count 7.0 TH/MM3 


 


Red Blood Count 4.54 MIL/MM3 


 


Hemoglobin 13.4 GM/DL 


 


Hematocrit 39.9 % 


 


Mean Corpuscular Volume 87.8 FL 


 


Mean Corpuscular Hemoglobin 29.6 PG 


 


Mean Corpuscular Hemoglobin


Concent 33.7 % 


 


 


Red Cell Distribution Width 13.3 % 


 


Platelet Count 233 TH/MM3 


 


Mean Platelet Volume 8.5 FL 


 


Neutrophils (%) (Auto) 73.9 % 


 


Lymphocytes (%) (Auto) 19.1 % 


 


Monocytes (%) (Auto) 6.1 % 


 


Eosinophils (%) (Auto) 0.6 % 


 


Basophils (%) (Auto) 0.3 % 


 


Neutrophils # (Auto) 5.2 TH/MM3 


 


Lymphocytes # (Auto) 1.3 TH/MM3 


 


Monocytes # (Auto) 0.4 TH/MM3 


 


Eosinophils # (Auto) 0.0 TH/MM3 


 


Basophils # (Auto) 0.0 TH/MM3 


 


CBC Comment DIFF FINAL 


 


Differential Comment  


 


Urine Color COLORLESS 


 


Urine Turbidity CLEAR 


 


Urine pH 6.5 


 


Urine Specific Gravity 1.001 


 


Urine Protein NEG mg/dL 


 


Urine Glucose (UA) NEG mg/dL 


 


Urine Ketones NEG mg/dL 


 


Urine Occult Blood NEG 


 


Urine Nitrite NEG 


 


Urine Bilirubin NEG 


 


Urine Urobilinogen


  LESS THAN 2.0


MG/DL


 


Urine Leukocyte Esterase NEG 


 


Urine WBC


  LESS THAN 1


/hpf


 


Microscopic Urinalysis Comment


  CULT NOT


INDICATED


 


Blood Urea Nitrogen 7 MG/DL 


 


Creatinine 0.64 MG/DL 


 


Random Glucose 92 MG/DL 


 


Total Protein 7.8 GM/DL 


 


Albumin 4.4 GM/DL 


 


Calcium Level 8.7 MG/DL 


 


Alkaline Phosphatase 50 U/L 


 


Aspartate Amino Transf


(AST/SGOT) 11 U/L 


 


 


Alanine Aminotransferase


(ALT/SGPT) 18 U/L 


 


 


Total Bilirubin 0.2 MG/DL 


 


Sodium Level 137 MEQ/L 


 


Potassium Level 3.6 MEQ/L 


 


Chloride Level 103 MEQ/L 


 


Carbon Dioxide Level 26.9 MEQ/L 


 


Anion Gap 7 MEQ/L 


 


Estimat Glomerular Filtration


Rate 112 ML/MIN 


 


 


Lipase 156 U/L 











[facility]


Medical Decision Making


Medical Screen Exam Complete:  Yes


Emergency Medical Condition:  Yes


Interpretation(s)


EKG: NSR 89bpm.  Normal axis.  TWI V2.  No ST segment elevation or depression.


Differential Diagnosis


Anxiety vs. atypical chest pain vs. GERD vs. PUD vs. gastritis


Narrative Course


28yo F with epigastric burning pain that radiates to chest for a year.  Does 

not think this is cardiac.  Pt is very anxious and cried when I talked to her.  

Labs reviewed, no leukocytosis.  CMP unremarkable.  Lipase normal.  UA 

negative.  Pantoprazole and ativan given.  CXR negative.  Urine pregnancy 

negative.  Pt reevaluated at bedside and feels much better.  Return precautions 

given.





Diagnosis





 Primary Impression:  


 Epigastric abdominal pain


Patient Instructions:  General Instructions


Departure Forms:  Tests/Procedures





***Additional Instructions:  


Please follow up with your primary care physician in 3-7 days.  Return to the 

ED if symptoms worsen.


***Med/Other Pt SpecificInfo:  Prescription(s) given


Scripts


Pantoprazole (Protonix) 40 Mg Tab


40 MG PO DAILY for Reflux, #15 TAB 0 Refills


   Prov: Belkis Santos          11/30/17


Disposition:  01 DISCHARGE HOME


Condition:  Stable











TomRenyBelkis DO Nov 29, 2017 23:03

## 2017-11-30 NOTE — EKG
Date Performed: 2017       Time Performed: 22:03:09

 

PTAGE:      27 years

 

EKG:      Sinus rhythm 

 

 WITH SINUS ARRHYTHMIA NORMAL ECG Since 

 

 PREVIOUS TRACING            , no significant change noted PREVIOUS TRACIN2017  20.00.31

 

DOCTOR:   Grace Ricketts  Interpretating Date/Time  2017 16:09:49

## 2018-02-16 ENCOUNTER — HOSPITAL ENCOUNTER (EMERGENCY)
Dept: HOSPITAL 17 - PHED | Age: 28
Discharge: HOME | End: 2018-02-16
Payer: COMMERCIAL

## 2018-02-16 VITALS
RESPIRATION RATE: 18 BRPM | TEMPERATURE: 97.1 F | HEART RATE: 92 BPM | OXYGEN SATURATION: 100 % | DIASTOLIC BLOOD PRESSURE: 77 MMHG | SYSTOLIC BLOOD PRESSURE: 126 MMHG

## 2018-02-16 VITALS — HEIGHT: 62 IN | BODY MASS INDEX: 22.68 KG/M2 | WEIGHT: 123.24 LBS

## 2018-02-16 DIAGNOSIS — J06.9: Primary | ICD-10-CM

## 2018-02-16 PROCEDURE — 87880 STREP A ASSAY W/OPTIC: CPT

## 2018-02-16 PROCEDURE — 87804 INFLUENZA ASSAY W/OPTIC: CPT

## 2018-02-16 PROCEDURE — 87081 CULTURE SCREEN ONLY: CPT

## 2018-02-16 PROCEDURE — 99283 EMERGENCY DEPT VISIT LOW MDM: CPT

## 2018-02-16 NOTE — PD
HPI


Chief Complaint:  Cold / Flu Symptoms


Time Seen by Provider:  21:51


Travel History


International Travel<30 days:  No


Contact w/Intl Traveler<30days:  No


Traveled to known affect area:  No





History of Present Illness


HPI


The patient is a 27-year-old female that complains of a sore throat, cough and 

chest pain because of the cough for 2 days.  Her chest pain is sharp and 

pleuritic and a 6/10.  She also has been sneezing.  She denies any shortness of 

breath.  She does not smoke.  She thinks she had a fever at home but never 

recorded a temperature at home.  She does have generalized myalgias.  She 

denies any nausea, vomiting or diarrhea.  She states there is no possibility of 

pregnancy.





[facility]


Past Medical History


Asthma:  Yes


Anxiety:  Yes


Diminished Hearing:  No


GERD:  Yes


Respiratory:  Yes (asthma)


Immunizations Current:  Yes


Pregnant?:  Not Pregnant


LMP:  02/02/18





Social History


Alcohol Use:  Yes (a couple days a week)


Tobacco Use:  No


Substance Use:  No





Allergies-Medications


(Allergen,Severity, Reaction):  


Uncoded Allergies:  


     pollen (Allergy, Unknown, Sneezing, 7/31/17)


Reported Meds & Prescriptions





Reported Meds & Active Scripts


Active


Reported


Lorazepam 1 Mg Tab 1 Mg PO BID


Proventil Hfa 6.7 GM Inh (Albuterol Sulfate) 90 Mcg/Act Aer 2 Puff INH Q4-6H PRN








Review of Systems


Except as stated in HPI:  all other systems reviewed are Neg





Physical Exam


Narrative


GENERAL: The patient is alert, oriented 3 in no respiratory distress.  Her 

vital signs are normal.


SKIN: Focused skin assessment warm/dry.


HEAD: Atraumatic. Normocephalic. 


EYES: Pupils equal and round. No scleral icterus. No injection or drainage. 


ENT: No nasal bleeding or discharge.  Mucous membranes pink and moist.  The 

throat shows no erythema, exudate nor abscess.  The tympanic membranes are 

clear.


NECK: Trachea midline. No JVD.  There is no meningismus present.  


CARDIOVASCULAR: Regular rate and rhythm.  No murmur appreciated.


RESPIRATORY: No accessory muscle use. Clear to auscultation. Breath sounds 

equal bilaterally. 


GASTROINTESTINAL: Abdomen soft, non-tender, nondistended. Hepatic and splenic 

margins not palpable. 


MUSCULOSKELETAL: No obvious deformities. No clubbing.  No cyanosis.  No edema. 


NEUROLOGICAL: Awake and alert. No obvious cranial nerve deficits.  Motor 

grossly within normal limits. Normal speech.


PSYCHIATRIC: Appropriate mood and affect; insight and judgment normal.





Data


Data


Last Documented VS





Vital Signs








  Date Time  Temp Pulse Resp B/P (MAP) Pulse Ox O2 Delivery O2 Flow Rate FiO2


 


2/16/18 21:55      Room Air  


 


2/16/18 21:44 97.1 92 18 126/77 (93) 100   








Orders





 Orders


Group A Rapid Strep Screen (2/16/18 21:57)


Influenzae A/B Antigen (2/16/18 21:57)


Strep Culture (Group A) (2/16/18 21:55)








MDM


Medical Decision Making


Medical Screen Exam Complete:  Yes


Emergency Medical Condition:  Yes


Medical Record Reviewed:  Yes


Interpretation(s)


The influenza A/B antigen is negative for flu a and flu B antigen.  The strep 

screen is negative for group A strep antigen.


Differential Diagnosis


Viral upper respiratory infection, influenza, strep pharyngitis, viral 

pharyngitis


Narrative Course


The patient has a viral upper respiratory infection.  She will be given Motrin 

and a 5 day work excuse.  She is to follow-up next week with her primary care 

physician.





Diagnosis





 Primary Impression:  


 Viral upper respiratory infection





***Additional Instructions:  


Rest, increase liquids and follow-up with your primary care physician next 

week.  Take the Motrin one tablet 3 times daily.


***Med/Other Pt SpecificInfo:  Prescription(s) given


Scripts


Ibuprofen (Ibuprofen) 800 Mg Tab


800 MG PO TID, #33 TAB 0 Refills


   Prov: Bradford Flanagan MD         2/16/18


Disposition:  01 DISCHARGE HOME


Condition:  Stable











Bradford Flanagan MD Feb 16, 2018 22:02